# Patient Record
Sex: MALE | Race: BLACK OR AFRICAN AMERICAN | Employment: OTHER | URBAN - METROPOLITAN AREA
[De-identification: names, ages, dates, MRNs, and addresses within clinical notes are randomized per-mention and may not be internally consistent; named-entity substitution may affect disease eponyms.]

---

## 2020-06-11 ENCOUNTER — HOSPITAL ENCOUNTER (OUTPATIENT)
Dept: PREADMISSION TESTING | Age: 75
Setting detail: SPECIMEN
Discharge: HOME | End: 2020-06-15
Payer: MEDICARE

## 2020-06-11 LAB
SARS-COV-2, PCR: NORMAL
SARS-COV-2, RAPID: NORMAL
SARS-COV-2: NOT DETECTED
SOURCE: NORMAL

## 2020-06-11 PROCEDURE — U0003 INFECTIOUS AGENT DETECTION BY NUCLEIC ACID (DNA OR RNA); SEVERE ACUTE RESPIRATORY SYNDROME CORONAVIRUS 2 (SARS-COV-2) (CORONAVIRUS DISEASE [COVID-19]), AMPLIFIED PROBE TECHNIQUE, MAKING USE OF HIGH THROUGHPUT TECHNOLOGIES AS DESCRIBED BY CMS-2020-01-R: HCPCS

## 2020-06-12 ENCOUNTER — TELEPHONE (OUTPATIENT)
Dept: PRIMARY CARE CLINIC | Age: 75
End: 2020-06-12

## 2020-06-15 ENCOUNTER — HOSPITAL ENCOUNTER (OUTPATIENT)
Dept: CARDIAC CATH/INVASIVE PROCEDURES | Age: 75
Setting detail: OBSERVATION
Discharge: HOME | End: 2020-06-16
Attending: INTERNAL MEDICINE | Admitting: INTERNAL MEDICINE
Payer: MEDICARE

## 2020-06-15 PROBLEM — Z98.61 S/P PTCA (PERCUTANEOUS TRANSLUMINAL CORONARY ANGIOPLASTY): Status: ACTIVE | Noted: 2020-06-15

## 2020-06-15 LAB
ACTIVATED CLOTTING TIME: 162 SEC (ref 79–149)
ACTIVATED CLOTTING TIME: 200 SEC (ref 79–149)
ACTIVATED CLOTTING TIME: 264 SEC (ref 79–149)
GFR NON-AFRICAN AMERICAN: 52 ML/MIN
GFR SERPL CREATININE-BSD FRML MDRD: >60 ML/MIN
GFR SERPL CREATININE-BSD FRML MDRD: ABNORMAL ML/MIN/{1.73_M2}
GLUCOSE BLD-MCNC: 101 MG/DL (ref 74–100)
PLATELET # BLD: 149 K/UL (ref 138–453)
POC CHLORIDE: 109 MMOL/L (ref 98–107)
POC CREATININE: 1.34 MG/DL (ref 0.51–1.19)
POC HEMATOCRIT: 38 % (ref 41–53)
POC HEMOGLOBIN: 12.9 G/DL (ref 13.5–17.5)
POC POTASSIUM: 3.7 MMOL/L (ref 3.5–4.5)
POC SODIUM: 145 MMOL/L (ref 138–146)

## 2020-06-15 PROCEDURE — 85014 HEMATOCRIT: CPT

## 2020-06-15 PROCEDURE — 84295 ASSAY OF SERUM SODIUM: CPT

## 2020-06-15 PROCEDURE — 93454 CORONARY ARTERY ANGIO S&I: CPT | Performed by: INTERNAL MEDICINE

## 2020-06-15 PROCEDURE — C9600 PERC DRUG-EL COR STENT SING: HCPCS | Performed by: INTERNAL MEDICINE

## 2020-06-15 PROCEDURE — 82435 ASSAY OF BLOOD CHLORIDE: CPT

## 2020-06-15 PROCEDURE — C1887 CATHETER, GUIDING: HCPCS

## 2020-06-15 PROCEDURE — 85049 AUTOMATED PLATELET COUNT: CPT

## 2020-06-15 PROCEDURE — 85347 COAGULATION TIME ACTIVATED: CPT

## 2020-06-15 PROCEDURE — 82947 ASSAY GLUCOSE BLOOD QUANT: CPT

## 2020-06-15 PROCEDURE — C1769 GUIDE WIRE: HCPCS

## 2020-06-15 PROCEDURE — C1753 CATH, INTRAVAS ULTRASOUND: HCPCS

## 2020-06-15 PROCEDURE — 82565 ASSAY OF CREATININE: CPT

## 2020-06-15 PROCEDURE — C1874 STENT, COATED/COV W/DEL SYS: HCPCS

## 2020-06-15 PROCEDURE — 6360000002 HC RX W HCPCS

## 2020-06-15 PROCEDURE — 6370000000 HC RX 637 (ALT 250 FOR IP)

## 2020-06-15 PROCEDURE — 84132 ASSAY OF SERUM POTASSIUM: CPT

## 2020-06-15 PROCEDURE — G0379 DIRECT REFER HOSPITAL OBSERV: HCPCS

## 2020-06-15 PROCEDURE — C1894 INTRO/SHEATH, NON-LASER: HCPCS

## 2020-06-15 PROCEDURE — C1725 CATH, TRANSLUMIN NON-LASER: HCPCS

## 2020-06-15 PROCEDURE — 2500000003 HC RX 250 WO HCPCS

## 2020-06-15 PROCEDURE — G0378 HOSPITAL OBSERVATION PER HR: HCPCS

## 2020-06-15 PROCEDURE — 7100000001 HC PACU RECOVERY - ADDTL 15 MIN

## 2020-06-15 PROCEDURE — 2709999900 HC NON-CHARGEABLE SUPPLY

## 2020-06-15 PROCEDURE — 6360000004 HC RX CONTRAST MEDICATION

## 2020-06-15 PROCEDURE — 7100000000 HC PACU RECOVERY - FIRST 15 MIN

## 2020-06-15 RX ORDER — SODIUM CHLORIDE 9 MG/ML
INJECTION, SOLUTION INTRAVENOUS CONTINUOUS
Status: DISCONTINUED | OUTPATIENT
Start: 2020-06-15 | End: 2020-06-16 | Stop reason: HOSPADM

## 2020-06-15 RX ORDER — SODIUM CHLORIDE 0.9 % (FLUSH) 0.9 %
10 SYRINGE (ML) INJECTION PRN
Status: DISCONTINUED | OUTPATIENT
Start: 2020-06-15 | End: 2020-06-16 | Stop reason: HOSPADM

## 2020-06-15 RX ORDER — ATORVASTATIN CALCIUM 80 MG/1
80 TABLET, FILM COATED ORAL DAILY
Status: DISCONTINUED | OUTPATIENT
Start: 2020-06-15 | End: 2020-06-15

## 2020-06-15 RX ORDER — NITROGLYCERIN 0.4 MG/1
0.4 TABLET SUBLINGUAL EVERY 5 MIN PRN
Status: DISCONTINUED | OUTPATIENT
Start: 2020-06-15 | End: 2020-06-16 | Stop reason: HOSPADM

## 2020-06-15 RX ORDER — ACETAMINOPHEN 325 MG/1
650 TABLET ORAL EVERY 4 HOURS PRN
Status: DISCONTINUED | OUTPATIENT
Start: 2020-06-15 | End: 2020-06-16 | Stop reason: HOSPADM

## 2020-06-15 RX ORDER — METOPROLOL SUCCINATE 50 MG/1
50 TABLET, EXTENDED RELEASE ORAL DAILY
Status: DISCONTINUED | OUTPATIENT
Start: 2020-06-15 | End: 2020-06-16 | Stop reason: HOSPADM

## 2020-06-15 RX ORDER — CLOPIDOGREL BISULFATE 75 MG/1
75 TABLET ORAL DAILY
Status: DISCONTINUED | OUTPATIENT
Start: 2020-06-16 | End: 2020-06-16 | Stop reason: HOSPADM

## 2020-06-15 RX ORDER — SODIUM CHLORIDE 0.9 % (FLUSH) 0.9 %
10 SYRINGE (ML) INJECTION EVERY 12 HOURS SCHEDULED
Status: DISCONTINUED | OUTPATIENT
Start: 2020-06-15 | End: 2020-06-16 | Stop reason: HOSPADM

## 2020-06-15 RX ORDER — ATORVASTATIN CALCIUM 20 MG/1
80 TABLET, FILM COATED ORAL DAILY
Status: DISCONTINUED | OUTPATIENT
Start: 2020-06-16 | End: 2020-06-16 | Stop reason: HOSPADM

## 2020-06-15 RX ORDER — ASPIRIN 81 MG/1
81 TABLET, CHEWABLE ORAL DAILY
Status: DISCONTINUED | OUTPATIENT
Start: 2020-06-15 | End: 2020-06-16 | Stop reason: HOSPADM

## 2020-06-15 RX ADMIN — SODIUM CHLORIDE: 0.9 INJECTION, SOLUTION INTRAVENOUS at 08:43

## 2020-06-15 RX ADMIN — ACETAMINOPHEN 650 MG: 325 TABLET ORAL at 15:08

## 2020-06-15 RX ADMIN — SODIUM CHLORIDE: 0.9 INJECTION, SOLUTION INTRAVENOUS at 07:53

## 2020-06-15 ASSESSMENT — PAIN SCALES - GENERAL
PAINLEVEL_OUTOF10: 4
PAINLEVEL_OUTOF10: 0
PAINLEVEL_OUTOF10: 0

## 2020-06-15 NOTE — H&P
Attestation signed by      Attending Physician Statement:    I have discussed the care of  Ingris Knott , including pertinent history and exam findings, with the Cardiology fellow/resident. I have seen and examined the patient and the key elements of all parts of the encounter have been performed by me. I agree with the assessment, plan and orders as documented by the fellow/resident, after I modified exam findings and plan of treatments, and the final version is my approved version of the assessment. Additional Comments: [facility]    Consult / H&P               Today's Date: 6/15/2020  Patient Name: Ingris Knott  Date of admission: 6/15/2020  7:06 AM  Patient's age: 69 y.o., 1945  Admission Dx: Abnormal result of other cardiovascular function study [R94.39]    Reason for Consult:  Cardiac evaluation    Requesting Physician: No admitting provider for patient encounter. CHIEF COMPLAINT:  Chest pain     History Obtained From:  patient, electronic medical record    HISTORY OF PRESENT ILLNESS:    Ingris Knott 69 y.o. male with known h/o CAD as mentioned below. He was seen in clinic on 04/30 with symptoms of chest pain with exertion. He was recommended to undergo stress test and ECHO    Past Medical History:   has a past medical history of CAD (coronary artery disease), Chest pressure, Dizzy, Hyperlipidemia, Hypertension, MI (myocardial infarction) ([address] Palpitations, Positive cardiac stress test, SOB (shortness of breath), Thyroid disease, Vasovagal syndrome, and Vertigo. Past Surgical History:   has a past surgical history that includes Coronary angioplasty (2013). Home Medications:    Prior to Admission medications    Medication Sig Start Date End Date Taking?  Authorizing Provider   clopidogrel (PLAVIX) 75 MG tablet Take 1 tablet by mouth daily 12/14/16  Yes THEODORE Castillo - CNP   atorvastatin (LIPITOR) 40 MG tablet Take 1 tablet by mouth daily hours.  FASTING LIPID PANEL:  Lab Results   Component Value Date    HDL 75 05/06/2016    TRIG 48 05/06/2016     LIVER PROFILE:No results for input(s): AST, ALT, LABALBU in the last 72 hours. IMPRESSION:    1. Angina  2. Abnormal Stress test - Moderate ischemia in the inferior wall, large anteroapical infarction   3. CAD- STEMI 2013 - cath 2016 - patent prox LAD stent, Mid LAD GENEVIEVE for 70 % stenosis, D1- GENEVIEVE, Ramus -GENEVIEVE. RCA IFR 0.91  4. H/O LV thrombus - not noticed on last TTE   5. EF 45% with anteroapical hypokinesis   6. HTN  7. Hyperlipidemia    Patient Active Problem List   Diagnosis    Status post insertion of drug eluting coronary artery stent - Mid LAd/d1 and Ramus       RECOMMENDATIONS:  1. Proceed with cath +/- PCI   2. Risk, benefits and alternatives of cardiac catheterization were discussed in detail. Risk of bleeding, requiring blood transfusion, vascular complication requiring surgery, renal insufficieny with need of dialysis, CVA, MI, death and anesthesia complications including intubation were discussed. Patient agrees to proceed and verbalizes understanding. Will discuss with rounding attending Dr. Gregorio Kidd for final recommendations. Lucius Talley MD  Cardiology Fellow    I have reviewed the case / procedure with resident / fellow  I have examined the patient personally  Patient agree with treatment plan, correction innotes was made as appropriate, and discussed final arrangement based on  my evaluation and exam.    Risk and benefit of procedure if planned were explained in details. Procedure was performed by me, with all aspect of the procedure being done using standard protocol. Note was modified based on my own assessment and treatment.     Floresita Landry MD  [facility]

## 2020-06-16 VITALS
SYSTOLIC BLOOD PRESSURE: 166 MMHG | TEMPERATURE: 97.9 F | RESPIRATION RATE: 21 BRPM | HEIGHT: 72 IN | HEART RATE: 73 BPM | OXYGEN SATURATION: 99 % | WEIGHT: 176 LBS | BODY MASS INDEX: 23.84 KG/M2 | DIASTOLIC BLOOD PRESSURE: 85 MMHG

## 2020-06-16 LAB
ANION GAP SERPL CALCULATED.3IONS-SCNC: 11 MMOL/L (ref 9–17)
BUN BLDV-MCNC: 14 MG/DL (ref 8–23)
BUN/CREAT BLD: ABNORMAL (ref 9–20)
CALCIUM SERPL-MCNC: 8.7 MG/DL (ref 8.6–10.4)
CHLORIDE BLD-SCNC: 108 MMOL/L (ref 98–107)
CO2: 22 MMOL/L (ref 20–31)
CREAT SERPL-MCNC: 0.99 MG/DL (ref 0.7–1.2)
GFR AFRICAN AMERICAN: >60 ML/MIN
GFR NON-AFRICAN AMERICAN: >60 ML/MIN
GFR SERPL CREATININE-BSD FRML MDRD: ABNORMAL ML/MIN/{1.73_M2}
GFR SERPL CREATININE-BSD FRML MDRD: ABNORMAL ML/MIN/{1.73_M2}
GLUCOSE BLD-MCNC: 97 MG/DL (ref 70–99)
POTASSIUM SERPL-SCNC: 3.9 MMOL/L (ref 3.7–5.3)
SODIUM BLD-SCNC: 141 MMOL/L (ref 135–144)

## 2020-06-16 PROCEDURE — 36415 COLL VENOUS BLD VENIPUNCTURE: CPT

## 2020-06-16 PROCEDURE — G0378 HOSPITAL OBSERVATION PER HR: HCPCS

## 2020-06-16 PROCEDURE — 2580000003 HC RX 258: Performed by: INTERNAL MEDICINE

## 2020-06-16 PROCEDURE — 80048 BASIC METABOLIC PNL TOTAL CA: CPT

## 2020-06-16 RX ORDER — ATORVASTATIN CALCIUM 80 MG/1
80 TABLET, FILM COATED ORAL DAILY
Qty: 30 TABLET | Refills: 3 | Status: SHIPPED | OUTPATIENT
Start: 2020-06-17

## 2020-06-16 RX ADMIN — ATORVASTATIN CALCIUM 80 MG: 20 TABLET, FILM COATED ORAL at 08:22

## 2020-06-16 RX ADMIN — SODIUM CHLORIDE: 0.9 INJECTION, SOLUTION INTRAVENOUS at 00:23

## 2020-06-16 RX ADMIN — METOPROLOL SUCCINATE 50 MG: 50 TABLET, EXTENDED RELEASE ORAL at 08:22

## 2020-06-16 NOTE — PROGRESS NOTES
Report called to [facility], patient transported to room 2010. Right groin assessed by Radha Eisenberg and Oksana Eng. Groin soft, no bleeding or hematoma noted. Pedal pulses palpable.

## 2020-06-16 NOTE — DISCHARGE INSTR - ACTIVITY
· If the doctor gave you a sedative:  ? For 24 hours, don't do anything that requires attention to detail, such as going to work, making important decisions, or signing any legal documents. It takes time for the medicine's effects to completely wear off.  ? For your safety, do not drive or operate any machinery that could be dangerous. Wait until the medicine wears off and you can think clearly and react easily. · Do not do strenuous exercise and do not lift, pull, or push anything heavy until your doctor says it is okay. This may be for a day or two. You can walk around the house and do light activity, such as cooking. · If the catheter was placed in your groin, try not to walk up stairs for the first couple of days. · If the catheter was placed in your arm near your wrist, do not bend your wrist deeply for the first couple of days. Be careful using your hand to get into and out of a chair or bed. · Carry your stent identification card with you at all times. · If your doctor recommends it, get more exercise. Walking is a good choice. Bit by bit, increase the amount you walk every day. Try for at least 30 minutes on most days of the week.

## 2020-06-16 NOTE — CARE COORDINATION
Case Management Initial Discharge Plan  Chacho Landry,             Met with:patient to discuss discharge plans. Information verified: address, contacts, phone number, , insurance Yes    Emergency Contact/Next of Kin name & number: ashley Hinton [phone]    PCP: Heather Hawley MD  Date of last visit: year ago    [place] Provider[place] [facility] [place]    Discharge Planning    Living Arrangements:  Spouse/Significant Other, Family Members   Support Systems:  Spouse/Significant Other, Family Members, [id number] has one story    Patient able to perform ADL's:Independent    Current Services (outpatient & in home) none  DME equipment: none  DME provider: n/a    Receiving oral anticoagulation therapy? No    If indicated:   Physician managing anticoagulation treatment:   Where does patient obtain lab work for ATC treatment? Potential Assistance Needed:  N/A    Patient agreeable to home care: No  [place] of choice provided:  n/a    Prior SNF/Rehab Placement and Facility: N/A  Agreeable to SNF/Rehab: No  [place] of choice provided: n/a     Evaluation: n/a    Expected Discharge date:  20    Patient expects to be discharged to:  home  Follow Up Appointment: Best Day/ Time:      Transportation provider: wife  Transportation arrangements needed for discharge: No    Readmission Risk              Risk of Unplanned Readmission:        0             Does patient have a readmission risk score greater than 14?: No  If yes, follow-up appointment must be made within 7 days of discharge.      Goals of Care: no chest pain      Discharge Plan: home with wife    [facility]          Electronically signed by Miladys Wright RN on 20 at 8:25 AM EDT

## 2020-06-16 NOTE — PROGRESS NOTES
CLINICAL PHARMACY NOTE: MEDS TO [address] Select Patient?: No  Total # of Prescriptions Filled: 1   The following medications were delivered to the patient:  · LIPITOR   Total # of Interventions Completed: 0  Time Spent (min): 30    Additional Documentation:

## 2022-04-08 ENCOUNTER — HOSPITAL ENCOUNTER (OUTPATIENT)
Age: 77
Setting detail: SPECIMEN
Discharge: HOME | End: 2022-04-08

## 2022-04-08 LAB
ALT SERPL-CCNC: 13 U/L (ref 5–41)
ANION GAP SERPL CALCULATED.3IONS-SCNC: 12 MMOL/L (ref 9–17)
AST SERPL-CCNC: 21 U/L
BUN BLDV-MCNC: 20 MG/DL (ref 8–23)
CALCIUM SERPL-MCNC: 9.5 MG/DL (ref 8.6–10.4)
CHLORIDE BLD-SCNC: 110 MMOL/L (ref 98–107)
CHOLESTEROL, FASTING: 128 MG/DL
CHOLESTEROL/HDL RATIO: 1.8
CO2: 25 MMOL/L (ref 20–31)
CREAT SERPL-MCNC: 1.13 MG/DL (ref 0.7–1.2)
GFR AFRICAN AMERICAN: >60 ML/MIN
GFR NON-AFRICAN AMERICAN: >60 ML/MIN
GFR SERPL CREATININE-BSD FRML MDRD: ABNORMAL ML/MIN/{1.73_M2}
GLUCOSE BLD-MCNC: 92 MG/DL (ref 70–99)
HDLC SERPL-MCNC: 70 MG/DL
LDL CHOLESTEROL: 48 MG/DL (ref 0–130)
POTASSIUM SERPL-SCNC: 4.4 MMOL/L (ref 3.7–5.3)
SODIUM BLD-SCNC: 147 MMOL/L (ref 135–144)
TRIGLYCERIDE, FASTING: 51 MG/DL

## 2023-04-17 LAB
ALT SERPL-CCNC: 9 U/L (ref 7–52)
AST SERPL-CCNC: 18 U/L (ref 13–39)
CHOLESTEROL, TOTAL: 110 MG/DL (ref 120–200)
CHOLESTEROL/HDL RATIO: 1.7 MG/DL
HDLC SERPL-MCNC: 65 MG/DL (ref 23–92)
LDL CHOLESTEROL: 38 MG/DL (ref 0–160)
NON HDL CHOL. (LDL+VLDL): 45
TOTAL VLDL-C: 7 MG/DL (ref 0–40)
TRIGL SERPL-MCNC: 37 MG/DL (ref 40–149)